# Patient Record
Sex: MALE | Race: WHITE | HISPANIC OR LATINO
[De-identification: names, ages, dates, MRNs, and addresses within clinical notes are randomized per-mention and may not be internally consistent; named-entity substitution may affect disease eponyms.]

---

## 2019-05-30 PROBLEM — Z00.00 ENCOUNTER FOR PREVENTIVE HEALTH EXAMINATION: Status: ACTIVE | Noted: 2019-05-30

## 2019-06-04 ENCOUNTER — APPOINTMENT (OUTPATIENT)
Dept: ORTHOPEDIC SURGERY | Facility: CLINIC | Age: 54
End: 2019-06-04
Payer: COMMERCIAL

## 2019-06-04 VITALS
HEART RATE: 90 BPM | SYSTOLIC BLOOD PRESSURE: 135 MMHG | DIASTOLIC BLOOD PRESSURE: 82 MMHG | BODY MASS INDEX: 34.36 KG/M2 | WEIGHT: 240 LBS | HEIGHT: 70 IN

## 2019-06-04 DIAGNOSIS — Z78.9 OTHER SPECIFIED HEALTH STATUS: ICD-10-CM

## 2019-06-04 DIAGNOSIS — M16.11 UNILATERAL PRIMARY OSTEOARTHRITIS, RIGHT HIP: ICD-10-CM

## 2019-06-04 DIAGNOSIS — Z60.2 PROBLEMS RELATED TO LIVING ALONE: ICD-10-CM

## 2019-06-04 PROCEDURE — 99205 OFFICE O/P NEW HI 60 MIN: CPT

## 2019-06-04 PROCEDURE — 73522 X-RAY EXAM HIPS BI 3-4 VIEWS: CPT

## 2019-06-04 RX ORDER — NAPROXEN SODIUM 220 MG
TABLET ORAL
Refills: 0 | Status: ACTIVE | COMMUNITY

## 2019-06-04 SDOH — SOCIAL STABILITY - SOCIAL INSECURITY: PROBLEMS RELATED TO LIVING ALONE: Z60.2

## 2019-06-04 NOTE — DISCUSSION/SUMMARY
[de-identified] : The patient about a right total hip replacement.  He comes here because he felt he needs it because of his marked pain and limitations.    A  long discussion was had with the patient as what the total joint replacement would entail.  A model was used to demonstrate the operation.  The hospitalization and rehabilitation were discussed.  He has a perioperative antibiotics and DVT prophylaxis were discussed.   The risks, benefits and alternatives to surgical intervention were discussed at length with the patient. Specific risks discussed included: infection, wound breakdown, numbness and damage to nerves, tendon, muscle, arteries or other blood vessels.  The possibility of recurrent pain, no improvement in pain and actual worsening of the pain were also mentioned in conversation with the patient. Medical complications related to the patient's general medical health including deep vein thrombosis, pulmonary embolus, heart attack, stroke, death and other complications from anesthesia were discussed as well. The patient was told that we will take steps to minimize these risks by using sterile technique, antibiotics and DVT prophylaxis when appropriate and following the patient postoperatively in the clinic setting to monitor progress. The benefits of surgery were discussed with the patient including the potential to improve the current clinical condition throughout operative intervention. Alternatives to surgical intervention include continued conservative management which may yield less than optimal results this particular patient. All questions were answered to the satisfaction of the patient.  A dual mobility will be considered in the operating room because of his job which is 1 of an  requires him to get in many different positions doing his work.

## 2019-06-04 NOTE — HISTORY OF PRESENT ILLNESS
[Stable] : stable [9] : a maximum pain level of 9/10 [5] : a current pain level of 5/10 [de-identified] : C/o RT groin pain x 5 yrs, gradually  getting worse with severity over the past 2 yrs.\par Denies injury, or radicular symptoms. \par Left groin is ok. \par He did PT 4 yrs ago.\par Reports RT knee pain occasionally with clicking. \par Aleve helps.\par Difficulty ambulating.\par Reports +limp\par Has xrays.

## 2019-06-04 NOTE — PHYSICAL EXAM
[de-identified] : Constitutional - the patient is of normal build and nutrition.  The patient remains oriented to person, time, and  place.  Mood is normal. Vital signs as recorded are within normal limits.  The patients gait is a significant limp off his right hip. The patient has satisfactory  balance and can to  easily walk on toes and heels.\par \par The patient has no difficulty with respiration. Respiration at rest is a normal rate. The patient is not short of breath and has not become short of breath with short ambulation. There is no audible wheezing. No coughing.\par \par Skin is normal for the patient's age. There are no abnormal masses or lymph nodes which stand out in the lower extremities.\par \par Spine - deep tendon reflexes are symmetric\par \par \par UPPER EXTREMITIES \par \par Shoulders ROM  is symmetric  and the motion is satisfactory.  There is no significant shoulder pain or limitation in motion which would make using a cane or a walker difficult. Shoulder stability and  strength are satisfactory.\par \par Circulation appears satisfactory with pedal pulses present.  There is no major edema in the lower legs. No skin tenderness or increased temperature. No major varicosities.\par \par HIP EXAMINATION the abduction and abduction as well as rotation measurements were taken with the hip in flexion.\par \par Motion\par Flexion right hip 100 degrees left hip 135 degrees, abduction right hip 30 degrees left hip 75 degrees, adduction right hip -20 degrees left hip 30 degrees, external rotation right hip 20 degrees left hip 70 degrees, internal rotation right hip -20 degrees left hip 10 degrees.  He has pain with any motion in his right hip.  He has pain with weightbearing on his right hip.  There is no crepitus in his hip.  He does hold his hip in slight external rotation.  Guards the strength in his right hip.\par \par \par \par KNEE EXAMINATION\par \par Motion\par Right Knee                 *[0-135]\par Left  Knee                  *[0-135]\par The Knees have very good motion. There is good medial lateral and anterior posterior stability. There is no crepitus. There is no effusion. There is good strength across the knees.\par \par Ankle and foot examination\par Of the ankle has normal motion.  There is normal ankle stability.  The patient has no major abnormalities of the foot.\par \par \par \par  [de-identified] : An AP of the pelvis and a lateral of the right joint space maintained but a very early osteophyte is forming of the inferior acetabulum.  His right hip shows severe degeneration bone against bone contact superiorly cystic degeneration of the acetabulum peripheral osteophytes at the head neck junction and an osteophyte off the inferior acetabulum.  Impression severe degenerative osteoarthritis right hip.

## 2019-06-24 ENCOUNTER — OUTPATIENT (OUTPATIENT)
Dept: OUTPATIENT SERVICES | Facility: HOSPITAL | Age: 54
LOS: 1 days | End: 2019-06-24
Payer: COMMERCIAL

## 2019-06-24 VITALS
SYSTOLIC BLOOD PRESSURE: 111 MMHG | WEIGHT: 248.02 LBS | HEART RATE: 78 BPM | OXYGEN SATURATION: 98 % | RESPIRATION RATE: 18 BRPM | DIASTOLIC BLOOD PRESSURE: 78 MMHG | HEIGHT: 70 IN | TEMPERATURE: 98 F

## 2019-06-24 DIAGNOSIS — Z29.9 ENCOUNTER FOR PROPHYLACTIC MEASURES, UNSPECIFIED: ICD-10-CM

## 2019-06-24 DIAGNOSIS — Z98.890 OTHER SPECIFIED POSTPROCEDURAL STATES: Chronic | ICD-10-CM

## 2019-06-24 DIAGNOSIS — M16.11 UNILATERAL PRIMARY OSTEOARTHRITIS, RIGHT HIP: ICD-10-CM

## 2019-06-24 DIAGNOSIS — G47.33 OBSTRUCTIVE SLEEP APNEA (ADULT) (PEDIATRIC): ICD-10-CM

## 2019-06-24 DIAGNOSIS — Z01.818 ENCOUNTER FOR OTHER PREPROCEDURAL EXAMINATION: ICD-10-CM

## 2019-06-24 DIAGNOSIS — Z90.49 ACQUIRED ABSENCE OF OTHER SPECIFIED PARTS OF DIGESTIVE TRACT: Chronic | ICD-10-CM

## 2019-06-24 LAB
ANION GAP SERPL CALC-SCNC: 12 MMOL/L — SIGNIFICANT CHANGE UP (ref 5–17)
BLD GP AB SCN SERPL QL: NEGATIVE — SIGNIFICANT CHANGE UP
BUN SERPL-MCNC: 21 MG/DL — SIGNIFICANT CHANGE UP (ref 7–23)
CALCIUM SERPL-MCNC: 9.8 MG/DL — SIGNIFICANT CHANGE UP (ref 8.4–10.5)
CHLORIDE SERPL-SCNC: 99 MMOL/L — SIGNIFICANT CHANGE UP (ref 96–108)
CO2 SERPL-SCNC: 26 MMOL/L — SIGNIFICANT CHANGE UP (ref 22–31)
CREAT SERPL-MCNC: 0.73 MG/DL — SIGNIFICANT CHANGE UP (ref 0.5–1.3)
GLUCOSE SERPL-MCNC: 88 MG/DL — SIGNIFICANT CHANGE UP (ref 70–99)
HBA1C BLD-MCNC: 6.2 % — HIGH (ref 4–5.6)
HCT VFR BLD CALC: 47.6 % — HIGH (ref 34.5–45)
HGB BLD-MCNC: 15.5 G/DL — SIGNIFICANT CHANGE UP (ref 11.5–15.5)
MCHC RBC-ENTMCNC: 29.4 PG — SIGNIFICANT CHANGE UP (ref 27–34)
MCHC RBC-ENTMCNC: 32.6 GM/DL — SIGNIFICANT CHANGE UP (ref 32–36)
MCV RBC AUTO: 90.3 FL — SIGNIFICANT CHANGE UP (ref 80–100)
MRSA PCR RESULT.: SIGNIFICANT CHANGE UP
PLATELET # BLD AUTO: 277 K/UL — SIGNIFICANT CHANGE UP (ref 150–400)
POTASSIUM SERPL-MCNC: 4.3 MMOL/L — SIGNIFICANT CHANGE UP (ref 3.5–5.3)
POTASSIUM SERPL-SCNC: 4.3 MMOL/L — SIGNIFICANT CHANGE UP (ref 3.5–5.3)
RBC # BLD: 5.27 M/UL — HIGH (ref 3.8–5.2)
RBC # FLD: 12.4 % — SIGNIFICANT CHANGE UP (ref 10.3–14.5)
RH IG SCN BLD-IMP: POSITIVE — SIGNIFICANT CHANGE UP
S AUREUS DNA NOSE QL NAA+PROBE: SIGNIFICANT CHANGE UP
SODIUM SERPL-SCNC: 137 MMOL/L — SIGNIFICANT CHANGE UP (ref 135–145)
WBC # BLD: 8.37 K/UL — SIGNIFICANT CHANGE UP (ref 3.8–10.5)
WBC # FLD AUTO: 8.37 K/UL — SIGNIFICANT CHANGE UP (ref 3.8–10.5)

## 2019-06-24 PROCEDURE — 87641 MR-STAPH DNA AMP PROBE: CPT

## 2019-06-24 PROCEDURE — 86850 RBC ANTIBODY SCREEN: CPT

## 2019-06-24 PROCEDURE — 80048 BASIC METABOLIC PNL TOTAL CA: CPT

## 2019-06-24 PROCEDURE — 85027 COMPLETE CBC AUTOMATED: CPT

## 2019-06-24 PROCEDURE — 87640 STAPH A DNA AMP PROBE: CPT

## 2019-06-24 PROCEDURE — 86901 BLOOD TYPING SEROLOGIC RH(D): CPT

## 2019-06-24 PROCEDURE — 83036 HEMOGLOBIN GLYCOSYLATED A1C: CPT

## 2019-06-24 PROCEDURE — 86900 BLOOD TYPING SEROLOGIC ABO: CPT

## 2019-06-24 PROCEDURE — G0463: CPT

## 2019-06-24 RX ORDER — CHLORHEXIDINE GLUCONATE 213 G/1000ML
1 SOLUTION TOPICAL DAILY
Refills: 0 | Status: DISCONTINUED | OUTPATIENT
Start: 2019-07-08 | End: 2019-07-09

## 2019-06-24 RX ORDER — SODIUM CHLORIDE 9 MG/ML
3 INJECTION INTRAMUSCULAR; INTRAVENOUS; SUBCUTANEOUS EVERY 8 HOURS
Refills: 0 | Status: DISCONTINUED | OUTPATIENT
Start: 2019-07-08 | End: 2019-07-09

## 2019-06-24 RX ORDER — LIDOCAINE HCL 20 MG/ML
0.2 VIAL (ML) INJECTION ONCE
Refills: 0 | Status: DISCONTINUED | OUTPATIENT
Start: 2019-07-08 | End: 2019-07-09

## 2019-06-24 RX ORDER — CEFAZOLIN SODIUM 1 G
2000 VIAL (EA) INJECTION ONCE
Refills: 0 | Status: COMPLETED | OUTPATIENT
Start: 2019-07-08 | End: 2019-07-08

## 2019-06-24 NOTE — H&P PST ADULT - NSICDXPASTSURGICALHX_GEN_ALL_CORE_FT
PAST SURGICAL HISTORY:  History of colonoscopy 2018    History of laparoscopic cholecystectomy 10 years ago

## 2019-06-24 NOTE — H&P PST ADULT - ASSESSMENT
CAPRINI SCORE [CLOT updated 18]    AGE RELATED RISK FACTORS                                                       MOBILITY RELATED FACTORS  [x ] Age 41-60 years                                            (1 Point)                    [ ] Bed rest                                                        (1 Point)  [ ] Age: 61-74 years                                           (2 Points)                  [ ] Plaster cast                                                   (2 Points)  [ ] Age= 75 years                                              (3 Points)                    [ ] Bed bound for more than 72 hours                 (2 Points)    DISEASE RELATED RISK FACTORS                                               GENDER SPECIFIC FACTORS  [ ] Edema in the lower extremities                       (1 Point)              [ ] Pregnancy                                                     (1 Point)  [ ] Varicose veins                                               (1 Point)                     [ ] Post-partum < 6 weeks                                   (1 Point)             [x ] BMI > 25 Kg/m2                                            (1 Point)                     [ ] Hormonal therapy  or oral contraception          (1 Point)                 [ ] Sepsis (in the previous month)                        (1 Point)               [ ] History of pregnancy complications                 (1 point)  [ ] Pneumonia or serious lung disease                                               [ ] Unexplained or recurrent                     (1 Point)           (in the previous month)                               (1 Point)  [ ] Abnormal pulmonary function test                     (1 Point)                 SURGERY RELATED RISK FACTORS  [ ] Acute myocardial infarction                              (1 Point)               [ ]  Section                                             (1 Point)  [ ] Congestive heart failure (in the previous month)  (1 Point)      [ ] Minor surgery                                                  (1 Point)   [ ] Inflammatory bowel disease                             (1 Point)               [ ] Arthroscopic surgery                                        (2 Points)  [ ] Central venous access                                      (2 Points)                [ ] General surgery lasting more than 45 minutes (2 points)  [ ] Present or previous malignancy                     (2 Points)                x[ ] Elective arthroplasty                                         (5 points)    [ ] Stroke (in the previous month)                          (5 Points)                                                                                                                                                           HEMATOLOGY RELATED FACTORS                                                 TRAUMA RELATED RISK FACTORS  [ ] Prior episodes of VTE                                     (3 Points)                [ ] Fracture of the hip, pelvis, or leg                       (5 Points)  [ ] Positive family history for VTE                         (3 Points)             [ ] Acute spinal cord injury (in the previous month)  (5 Points)  [ ] Prothrombin 59201 A                                     (3 Points)               [ ] Paralysis  (less than 1 month)                             (5 Points)  [ ] Factor V Leiden                                             (3 Points)                  [ ] Multiple Trauma within 1 month                        (5 Points)  [ ] Lupus anticoagulants                                     (3 Points)                                                           [ ] Anticardiolipin antibodies                               (3 Points)                                                       [ ] High homocysteine in the blood                      (3 Points)                                             [ ] Other congenital or acquired thrombophilia      (3 Points)                                                [ ] Heparin induced thrombocytopenia                  (3 Points)                                     Total Score [    7      ]

## 2019-06-24 NOTE — H&P PST ADULT - NSICDXPASTMEDICALHX_GEN_ALL_CORE_FT
PAST MEDICAL HISTORY:  Obesity BMI- 35    YAAKOV (obstructive sleep apnea) Criteria    Unilateral primary osteoarthritis, right hip

## 2019-06-24 NOTE — H&P PST ADULT - NSICDXPROBLEM_GEN_ALL_CORE_FT
PROBLEM DIAGNOSES  Problem: Unilateral primary osteoarthritis, right hip  Assessment and Plan: Right Total Hip Replacement on 7/8/19  Pre- Op Instructions discussed   labs sent  pre- emptive ordered     Problem: Prophylactic measure  Assessment and Plan: The Caprini score indicates that this patient is at high risk for a VTE event (score 6 or greater). Surgical patients in this group will benefit from both pharmacologic prophylaxis and intermittent compression devices.  The surgical team will determine the balance between VTE risk and bleeding risk, and other clinical considerations      Problem: YAAKOV (obstructive sleep apnea)  Assessment and Plan: OR Booking Notified   YAAKOV Precautions

## 2019-06-24 NOTE — H&P PST ADULT - HISTORY OF PRESENT ILLNESS
53 y/o male with c/o  RT groin pain x 5 yrs, gradually getting worse with severity over the past 2 yrs, no improvement with conservative management followed by ortho . Pt Denies injury, or radicular symptoms. Presents to PST for scheduled Right Total Hip Replacement on 7/8/19 .

## 2019-06-24 NOTE — H&P PST ADULT - NSANTHOSAYNRD_GEN_A_CORE
No. YAAKOV screening performed.  STOP BANG Legend: 0-2 = LOW Risk; 3-4 = INTERMEDIATE Risk; 5-8 = HIGH Risk No. YAAKOV screening performed.  STOP BANG Legend: 0-2 = LOW Risk; 3-4 = INTERMEDIATE Risk; 5-8 = HIGH Risk/criteria

## 2019-07-07 ENCOUNTER — TRANSCRIPTION ENCOUNTER (OUTPATIENT)
Age: 54
End: 2019-07-07

## 2019-07-08 ENCOUNTER — APPOINTMENT (OUTPATIENT)
Dept: ORTHOPEDIC SURGERY | Facility: HOSPITAL | Age: 54
End: 2019-07-08

## 2019-07-08 ENCOUNTER — INPATIENT (INPATIENT)
Facility: HOSPITAL | Age: 54
LOS: 0 days | Discharge: HOME | DRG: 470 | End: 2019-07-09
Attending: ORTHOPAEDIC SURGERY | Admitting: ORTHOPAEDIC SURGERY
Payer: COMMERCIAL

## 2019-07-08 ENCOUNTER — RESULT REVIEW (OUTPATIENT)
Age: 54
End: 2019-07-08

## 2019-07-08 VITALS
HEIGHT: 70 IN | OXYGEN SATURATION: 98 % | SYSTOLIC BLOOD PRESSURE: 123 MMHG | TEMPERATURE: 98 F | DIASTOLIC BLOOD PRESSURE: 83 MMHG | HEART RATE: 82 BPM | WEIGHT: 248.02 LBS | RESPIRATION RATE: 18 BRPM

## 2019-07-08 DIAGNOSIS — M16.11 UNILATERAL PRIMARY OSTEOARTHRITIS, RIGHT HIP: ICD-10-CM

## 2019-07-08 DIAGNOSIS — Z90.49 ACQUIRED ABSENCE OF OTHER SPECIFIED PARTS OF DIGESTIVE TRACT: Chronic | ICD-10-CM

## 2019-07-08 DIAGNOSIS — Z98.890 OTHER SPECIFIED POSTPROCEDURAL STATES: Chronic | ICD-10-CM

## 2019-07-08 LAB
ANION GAP SERPL CALC-SCNC: 12 MMOL/L — SIGNIFICANT CHANGE UP (ref 5–17)
BUN SERPL-MCNC: 18 MG/DL — SIGNIFICANT CHANGE UP (ref 7–23)
CALCIUM SERPL-MCNC: 8.9 MG/DL — SIGNIFICANT CHANGE UP (ref 8.4–10.5)
CHLORIDE SERPL-SCNC: 101 MMOL/L — SIGNIFICANT CHANGE UP (ref 96–108)
CO2 SERPL-SCNC: 27 MMOL/L — SIGNIFICANT CHANGE UP (ref 22–31)
CREAT SERPL-MCNC: 0.88 MG/DL — SIGNIFICANT CHANGE UP (ref 0.5–1.3)
GLUCOSE BLDC GLUCOMTR-MCNC: 138 MG/DL — HIGH (ref 70–99)
GLUCOSE SERPL-MCNC: 143 MG/DL — HIGH (ref 70–99)
HCT VFR BLD CALC: 43.1 % — SIGNIFICANT CHANGE UP (ref 39–50)
HGB BLD-MCNC: 15.2 G/DL — SIGNIFICANT CHANGE UP (ref 13–17)
MCHC RBC-ENTMCNC: 31.5 PG — SIGNIFICANT CHANGE UP (ref 27–34)
MCHC RBC-ENTMCNC: 35.2 GM/DL — SIGNIFICANT CHANGE UP (ref 32–36)
MCV RBC AUTO: 89.7 FL — SIGNIFICANT CHANGE UP (ref 80–100)
PLATELET # BLD AUTO: 261 K/UL — SIGNIFICANT CHANGE UP (ref 150–400)
POTASSIUM SERPL-MCNC: 4.6 MMOL/L — SIGNIFICANT CHANGE UP (ref 3.5–5.3)
POTASSIUM SERPL-SCNC: 4.6 MMOL/L — SIGNIFICANT CHANGE UP (ref 3.5–5.3)
RBC # BLD: 4.81 M/UL — SIGNIFICANT CHANGE UP (ref 4.2–5.8)
RBC # FLD: 11.5 % — SIGNIFICANT CHANGE UP (ref 10.3–14.5)
RH IG SCN BLD-IMP: POSITIVE — SIGNIFICANT CHANGE UP
SODIUM SERPL-SCNC: 140 MMOL/L — SIGNIFICANT CHANGE UP (ref 135–145)
WBC # BLD: 14.3 K/UL — HIGH (ref 3.8–10.5)
WBC # FLD AUTO: 14.3 K/UL — HIGH (ref 3.8–10.5)

## 2019-07-08 PROCEDURE — 72170 X-RAY EXAM OF PELVIS: CPT | Mod: 26

## 2019-07-08 PROCEDURE — 88311 DECALCIFY TISSUE: CPT | Mod: 26

## 2019-07-08 PROCEDURE — 27130 TOTAL HIP ARTHROPLASTY: CPT | Mod: RT

## 2019-07-08 PROCEDURE — 88305 TISSUE EXAM BY PATHOLOGIST: CPT | Mod: 26

## 2019-07-08 PROCEDURE — 73501 X-RAY EXAM HIP UNI 1 VIEW: CPT | Mod: 26,RT

## 2019-07-08 RX ORDER — CEFAZOLIN SODIUM 1 G
2000 VIAL (EA) INJECTION EVERY 8 HOURS
Refills: 0 | Status: COMPLETED | OUTPATIENT
Start: 2019-07-08 | End: 2019-07-09

## 2019-07-08 RX ORDER — OXYCODONE HYDROCHLORIDE 5 MG/1
5 TABLET ORAL EVERY 4 HOURS
Refills: 0 | Status: DISCONTINUED | OUTPATIENT
Start: 2019-07-08 | End: 2019-07-09

## 2019-07-08 RX ORDER — ACETAMINOPHEN 500 MG
975 TABLET ORAL ONCE
Refills: 0 | Status: DISCONTINUED | OUTPATIENT
Start: 2019-07-08 | End: 2019-07-08

## 2019-07-08 RX ORDER — PANTOPRAZOLE SODIUM 20 MG/1
40 TABLET, DELAYED RELEASE ORAL ONCE
Refills: 0 | Status: COMPLETED | OUTPATIENT
Start: 2019-07-08 | End: 2019-07-08

## 2019-07-08 RX ORDER — DOCUSATE SODIUM 100 MG
100 CAPSULE ORAL THREE TIMES A DAY
Refills: 0 | Status: DISCONTINUED | OUTPATIENT
Start: 2019-07-08 | End: 2019-07-09

## 2019-07-08 RX ORDER — KETOROLAC TROMETHAMINE 30 MG/ML
15 SYRINGE (ML) INJECTION EVERY 8 HOURS
Refills: 0 | Status: DISCONTINUED | OUTPATIENT
Start: 2019-07-08 | End: 2019-07-09

## 2019-07-08 RX ORDER — SENNA PLUS 8.6 MG/1
2 TABLET ORAL AT BEDTIME
Refills: 0 | Status: DISCONTINUED | OUTPATIENT
Start: 2019-07-08 | End: 2019-07-09

## 2019-07-08 RX ORDER — SODIUM CHLORIDE 9 MG/ML
1000 INJECTION, SOLUTION INTRAVENOUS
Refills: 0 | Status: DISCONTINUED | OUTPATIENT
Start: 2019-07-08 | End: 2019-07-09

## 2019-07-08 RX ORDER — ONDANSETRON 8 MG/1
4 TABLET, FILM COATED ORAL EVERY 6 HOURS
Refills: 0 | Status: DISCONTINUED | OUTPATIENT
Start: 2019-07-08 | End: 2019-07-09

## 2019-07-08 RX ORDER — PANTOPRAZOLE SODIUM 20 MG/1
40 TABLET, DELAYED RELEASE ORAL
Refills: 0 | Status: DISCONTINUED | OUTPATIENT
Start: 2019-07-08 | End: 2019-07-09

## 2019-07-08 RX ORDER — ACETAMINOPHEN 500 MG
975 TABLET ORAL EVERY 6 HOURS
Refills: 0 | Status: DISCONTINUED | OUTPATIENT
Start: 2019-07-08 | End: 2019-07-09

## 2019-07-08 RX ORDER — TRAMADOL HYDROCHLORIDE 50 MG/1
50 TABLET ORAL EVERY 8 HOURS
Refills: 0 | Status: DISCONTINUED | OUTPATIENT
Start: 2019-07-08 | End: 2019-07-09

## 2019-07-08 RX ORDER — ONDANSETRON 8 MG/1
4 TABLET, FILM COATED ORAL ONCE
Refills: 0 | Status: DISCONTINUED | OUTPATIENT
Start: 2019-07-08 | End: 2019-07-08

## 2019-07-08 RX ORDER — GABAPENTIN 400 MG/1
300 CAPSULE ORAL ONCE
Refills: 0 | Status: COMPLETED | OUTPATIENT
Start: 2019-07-08 | End: 2019-07-08

## 2019-07-08 RX ORDER — SODIUM CHLORIDE 9 MG/ML
500 INJECTION, SOLUTION INTRAVENOUS ONCE
Refills: 0 | Status: COMPLETED | OUTPATIENT
Start: 2019-07-08 | End: 2019-07-08

## 2019-07-08 RX ORDER — ASPIRIN/CALCIUM CARB/MAGNESIUM 324 MG
325 TABLET ORAL
Refills: 0 | Status: DISCONTINUED | OUTPATIENT
Start: 2019-07-08 | End: 2019-07-09

## 2019-07-08 RX ORDER — POLYETHYLENE GLYCOL 3350 17 G/17G
17 POWDER, FOR SOLUTION ORAL DAILY
Refills: 0 | Status: DISCONTINUED | OUTPATIENT
Start: 2019-07-08 | End: 2019-07-09

## 2019-07-08 RX ORDER — SODIUM CHLORIDE 9 MG/ML
500 INJECTION, SOLUTION INTRAVENOUS ONCE
Refills: 0 | Status: COMPLETED | OUTPATIENT
Start: 2019-07-09 | End: 2019-07-09

## 2019-07-08 RX ORDER — DEXAMETHASONE 0.5 MG/5ML
8 ELIXIR ORAL ONCE
Refills: 0 | Status: COMPLETED | OUTPATIENT
Start: 2019-07-09 | End: 2019-07-09

## 2019-07-08 RX ORDER — CELECOXIB 200 MG/1
200 CAPSULE ORAL
Refills: 0 | Status: DISCONTINUED | OUTPATIENT
Start: 2019-07-08 | End: 2019-07-09

## 2019-07-08 RX ORDER — OXYCODONE HYDROCHLORIDE 5 MG/1
10 TABLET ORAL EVERY 4 HOURS
Refills: 0 | Status: DISCONTINUED | OUTPATIENT
Start: 2019-07-08 | End: 2019-07-09

## 2019-07-08 RX ORDER — TRAMADOL HYDROCHLORIDE 50 MG/1
50 TABLET ORAL ONCE
Refills: 0 | Status: DISCONTINUED | OUTPATIENT
Start: 2019-07-08 | End: 2019-07-08

## 2019-07-08 RX ORDER — MAGNESIUM HYDROXIDE 400 MG/1
30 TABLET, CHEWABLE ORAL DAILY
Refills: 0 | Status: DISCONTINUED | OUTPATIENT
Start: 2019-07-08 | End: 2019-07-09

## 2019-07-08 RX ORDER — HYDROMORPHONE HYDROCHLORIDE 2 MG/ML
0.5 INJECTION INTRAMUSCULAR; INTRAVENOUS; SUBCUTANEOUS
Refills: 0 | Status: DISCONTINUED | OUTPATIENT
Start: 2019-07-08 | End: 2019-07-08

## 2019-07-08 RX ADMIN — SODIUM CHLORIDE 3 MILLILITER(S): 9 INJECTION INTRAMUSCULAR; INTRAVENOUS; SUBCUTANEOUS at 14:09

## 2019-07-08 RX ADMIN — Medication 100 MILLIGRAM(S): at 19:53

## 2019-07-08 RX ADMIN — Medication 15 MILLIGRAM(S): at 22:47

## 2019-07-08 RX ADMIN — Medication 325 MILLIGRAM(S): at 18:14

## 2019-07-08 RX ADMIN — CHLORHEXIDINE GLUCONATE 1 APPLICATION(S): 213 SOLUTION TOPICAL at 05:57

## 2019-07-08 RX ADMIN — TRAMADOL HYDROCHLORIDE 50 MILLIGRAM(S): 50 TABLET ORAL at 22:17

## 2019-07-08 RX ADMIN — TRAMADOL HYDROCHLORIDE 50 MILLIGRAM(S): 50 TABLET ORAL at 22:47

## 2019-07-08 RX ADMIN — SODIUM CHLORIDE 1000 MILLILITER(S): 9 INJECTION, SOLUTION INTRAVENOUS at 10:30

## 2019-07-08 RX ADMIN — PANTOPRAZOLE SODIUM 40 MILLIGRAM(S): 20 TABLET, DELAYED RELEASE ORAL at 05:57

## 2019-07-08 RX ADMIN — Medication 15 MILLIGRAM(S): at 22:17

## 2019-07-08 RX ADMIN — Medication 15 MILLIGRAM(S): at 14:10

## 2019-07-08 RX ADMIN — Medication 975 MILLIGRAM(S): at 18:43

## 2019-07-08 RX ADMIN — SODIUM CHLORIDE 3 MILLILITER(S): 9 INJECTION INTRAMUSCULAR; INTRAVENOUS; SUBCUTANEOUS at 05:57

## 2019-07-08 RX ADMIN — Medication 100 MILLIGRAM(S): at 22:17

## 2019-07-08 RX ADMIN — Medication 975 MILLIGRAM(S): at 18:13

## 2019-07-08 RX ADMIN — Medication 100 MILLIGRAM(S): at 18:14

## 2019-07-08 RX ADMIN — SODIUM CHLORIDE 3 MILLILITER(S): 9 INJECTION INTRAMUSCULAR; INTRAVENOUS; SUBCUTANEOUS at 22:21

## 2019-07-08 RX ADMIN — GABAPENTIN 300 MILLIGRAM(S): 400 CAPSULE ORAL at 05:56

## 2019-07-08 RX ADMIN — SODIUM CHLORIDE 1000 MILLILITER(S): 9 INJECTION, SOLUTION INTRAVENOUS at 15:35

## 2019-07-08 RX ADMIN — TRAMADOL HYDROCHLORIDE 50 MILLIGRAM(S): 50 TABLET ORAL at 06:57

## 2019-07-08 NOTE — PHYSICAL THERAPY INITIAL EVALUATION ADULT - GENERAL OBSERVATIONS, REHAB EVAL
RACHNA Qureshi called PT department 2/2 pt having to urinate. Pt amadou initial eval fair. Pt rec'd in bed, peripheral IV lock, hip pillow, fiance at bedside, on room air, NAD. Pt agreed to session. Pt moving extremities freely. PT reviewed hip precautions.

## 2019-07-08 NOTE — PHYSICAL THERAPY INITIAL EVALUATION ADULT - ADDITIONAL COMMENTS
Pt states he lives alone in a private home with 6 steps to enter (+handrail), and a flight of steps to bedroom. Pt states prior to admission being independent with all functional mobility and ADLs. Pt states he was working as an  prior to admission.

## 2019-07-08 NOTE — PRE-ANESTHESIA EVALUATION ADULT - NSANTHOSAYNRD_GEN_A_CORE
criteria/No. YAAKOV screening performed.  STOP BANG Legend: 0-2 = LOW Risk; 3-4 = INTERMEDIATE Risk; 5-8 = HIGH Risk

## 2019-07-08 NOTE — PHYSICAL THERAPY INITIAL EVALUATION ADULT - PLANNED THERAPY INTERVENTIONS, PT EVAL
GOAL: Pt will negotiate 9 steps with unilateral handrail in a step-to pattern independently within 2 weeks/gait training/balance training/bed mobility training/strengthening/transfer training

## 2019-07-08 NOTE — CHART NOTE - NSCHARTNOTEFT_GEN_A_CORE
Seen in PACU trying to void  No Chest Pain, SOB, N/V.    T(C): 36.4 (07-08-19 @ 10:00), Max: 36.6 (07-08-19 @ 05:25)  HR: 93 (07-08-19 @ 12:00) (82 - 93)  BP: 120/79 (07-08-19 @ 12:00) (120/79 - 140/75)  RR: 16 (07-08-19 @ 12:00) (12 - 18)  SpO2: 100% (07-08-19 @ 12:00) (88% - 100%)  Wt(kg): --    Exam:  Alert and [place], No Acute Distress  Card: +S1/S2, RRR  Pulm: CTAB  Abdomen soft / benign  [place]  [n ]   EXT   RLE       Aquacel dressing C/D [ x]        Calves soft       (+) DF / PF (strong)  PT;  EHL/ FHL  5/5        No Sensory Deficits noted        2+ pulses  cap refill: brisk 2-3 secs    Xray:---- prosthesis in good alignment                          15.2   14.3<H> )-----------( 261      ( 08 Jul 2019 11:12 )             43.1      07-08    140  |  101  |  18  ----------------------------<  143<H>  4.6   |  27  |  0.88      A/P: S/p R Total hip arthroplasty      -PT/OT-WBAT-posterior precautions  -Chk AM Labs: cbc sma-7  -DVT PPx: Ecotrin 325mg BID  -Pain Control PO/IV Pain Rx  -Continue Current Tx  -Dispo planning: anticipate home      ***See Above  Naren GRECO  Orthopedics  B: [phone]  S: [phone]

## 2019-07-09 ENCOUNTER — TRANSCRIPTION ENCOUNTER (OUTPATIENT)
Age: 54
End: 2019-07-09

## 2019-07-09 VITALS
DIASTOLIC BLOOD PRESSURE: 77 MMHG | RESPIRATION RATE: 18 BRPM | OXYGEN SATURATION: 98 % | TEMPERATURE: 98 F | HEART RATE: 78 BPM | SYSTOLIC BLOOD PRESSURE: 120 MMHG

## 2019-07-09 DIAGNOSIS — M16.11 UNILATERAL PRIMARY OSTEOARTHRITIS, RIGHT HIP: ICD-10-CM

## 2019-07-09 LAB
ANION GAP SERPL CALC-SCNC: 9 MMOL/L — SIGNIFICANT CHANGE UP (ref 5–17)
BUN SERPL-MCNC: 18 MG/DL — SIGNIFICANT CHANGE UP (ref 7–23)
CALCIUM SERPL-MCNC: 8.8 MG/DL — SIGNIFICANT CHANGE UP (ref 8.4–10.5)
CHLORIDE SERPL-SCNC: 101 MMOL/L — SIGNIFICANT CHANGE UP (ref 96–108)
CO2 SERPL-SCNC: 27 MMOL/L — SIGNIFICANT CHANGE UP (ref 22–31)
CREAT SERPL-MCNC: 0.81 MG/DL — SIGNIFICANT CHANGE UP (ref 0.5–1.3)
GLUCOSE SERPL-MCNC: 126 MG/DL — HIGH (ref 70–99)
HCT VFR BLD CALC: 41 % — SIGNIFICANT CHANGE UP (ref 39–50)
HGB BLD-MCNC: 13.4 G/DL — SIGNIFICANT CHANGE UP (ref 13–17)
MCHC RBC-ENTMCNC: 29.6 PG — SIGNIFICANT CHANGE UP (ref 27–34)
MCHC RBC-ENTMCNC: 32.7 GM/DL — SIGNIFICANT CHANGE UP (ref 32–36)
MCV RBC AUTO: 90.5 FL — SIGNIFICANT CHANGE UP (ref 80–100)
PLATELET # BLD AUTO: 276 K/UL — SIGNIFICANT CHANGE UP (ref 150–400)
POTASSIUM SERPL-MCNC: 4.1 MMOL/L — SIGNIFICANT CHANGE UP (ref 3.5–5.3)
POTASSIUM SERPL-SCNC: 4.1 MMOL/L — SIGNIFICANT CHANGE UP (ref 3.5–5.3)
RBC # BLD: 4.53 M/UL — SIGNIFICANT CHANGE UP (ref 4.2–5.8)
RBC # FLD: 12.3 % — SIGNIFICANT CHANGE UP (ref 10.3–14.5)
SODIUM SERPL-SCNC: 137 MMOL/L — SIGNIFICANT CHANGE UP (ref 135–145)
WBC # BLD: 12.09 K/UL — HIGH (ref 3.8–10.5)
WBC # FLD AUTO: 12.09 K/UL — HIGH (ref 3.8–10.5)

## 2019-07-09 PROCEDURE — 80048 BASIC METABOLIC PNL TOTAL CA: CPT

## 2019-07-09 PROCEDURE — 97161 PT EVAL LOW COMPLEX 20 MIN: CPT

## 2019-07-09 PROCEDURE — C1776: CPT

## 2019-07-09 PROCEDURE — 72170 X-RAY EXAM OF PELVIS: CPT

## 2019-07-09 PROCEDURE — 86901 BLOOD TYPING SEROLOGIC RH(D): CPT

## 2019-07-09 PROCEDURE — 73501 X-RAY EXAM HIP UNI 1 VIEW: CPT

## 2019-07-09 PROCEDURE — 82962 GLUCOSE BLOOD TEST: CPT

## 2019-07-09 PROCEDURE — 86900 BLOOD TYPING SEROLOGIC ABO: CPT

## 2019-07-09 PROCEDURE — 88311 DECALCIFY TISSUE: CPT

## 2019-07-09 PROCEDURE — 88305 TISSUE EXAM BY PATHOLOGIST: CPT

## 2019-07-09 PROCEDURE — 97530 THERAPEUTIC ACTIVITIES: CPT

## 2019-07-09 PROCEDURE — 97116 GAIT TRAINING THERAPY: CPT

## 2019-07-09 PROCEDURE — 85027 COMPLETE CBC AUTOMATED: CPT

## 2019-07-09 PROCEDURE — 97165 OT EVAL LOW COMPLEX 30 MIN: CPT

## 2019-07-09 RX ORDER — POLYETHYLENE GLYCOL 3350 17 G/17G
17 POWDER, FOR SOLUTION ORAL
Qty: 0 | Refills: 0 | DISCHARGE
Start: 2019-07-09

## 2019-07-09 RX ORDER — SENNA PLUS 8.6 MG/1
2 TABLET ORAL
Qty: 0 | Refills: 0 | DISCHARGE
Start: 2019-07-09

## 2019-07-09 RX ORDER — ACETAMINOPHEN 500 MG
3 TABLET ORAL
Qty: 0 | Refills: 0 | DISCHARGE
Start: 2019-07-09

## 2019-07-09 RX ORDER — OXYCODONE HYDROCHLORIDE 5 MG/1
1 TABLET ORAL
Qty: 42 | Refills: 0
Start: 2019-07-09 | End: 2019-07-15

## 2019-07-09 RX ORDER — PANTOPRAZOLE SODIUM 20 MG/1
1 TABLET, DELAYED RELEASE ORAL
Qty: 30 | Refills: 0
Start: 2019-07-09 | End: 2019-08-07

## 2019-07-09 RX ORDER — ASPIRIN/CALCIUM CARB/MAGNESIUM 324 MG
1 TABLET ORAL
Qty: 80 | Refills: 0
Start: 2019-07-09 | End: 2019-08-17

## 2019-07-09 RX ORDER — TRAMADOL HYDROCHLORIDE 50 MG/1
1 TABLET ORAL
Qty: 28 | Refills: 0
Start: 2019-07-09 | End: 2019-07-15

## 2019-07-09 RX ADMIN — Medication 325 MILLIGRAM(S): at 06:24

## 2019-07-09 RX ADMIN — Medication 101.6 MILLIGRAM(S): at 06:31

## 2019-07-09 RX ADMIN — CELECOXIB 200 MILLIGRAM(S): 200 CAPSULE ORAL at 06:30

## 2019-07-09 RX ADMIN — SODIUM CHLORIDE 1000 MILLILITER(S): 9 INJECTION, SOLUTION INTRAVENOUS at 06:31

## 2019-07-09 RX ADMIN — CELECOXIB 200 MILLIGRAM(S): 200 CAPSULE ORAL at 07:00

## 2019-07-09 RX ADMIN — PANTOPRAZOLE SODIUM 40 MILLIGRAM(S): 20 TABLET, DELAYED RELEASE ORAL at 06:24

## 2019-07-09 RX ADMIN — Medication 975 MILLIGRAM(S): at 06:24

## 2019-07-09 RX ADMIN — Medication 100 MILLIGRAM(S): at 03:56

## 2019-07-09 RX ADMIN — Medication 975 MILLIGRAM(S): at 13:00

## 2019-07-09 RX ADMIN — SODIUM CHLORIDE 3 MILLILITER(S): 9 INJECTION INTRAMUSCULAR; INTRAVENOUS; SUBCUTANEOUS at 06:34

## 2019-07-09 RX ADMIN — Medication 975 MILLIGRAM(S): at 12:30

## 2019-07-09 RX ADMIN — Medication 975 MILLIGRAM(S): at 07:00

## 2019-07-09 NOTE — CONSULT NOTE ADULT - SUBJECTIVE AND OBJECTIVE BOX
CHIEF COMPLAINT:Patient is a 54y old  Male who presents with a chief complaint of right thr (09 Jul 2019 06:34)      HISTORY OF PRESENT ILLNESS:      This is a pleasant 54 male with history as below, known to me from office   is now s/p right THR   denies any chest pain, sob, palpitation, dizziness or syncope.     PAST MEDICAL & SURGICAL HISTORY:  YAAKOV (obstructive sleep apnea): Criteria  Unilateral primary osteoarthritis, right hip  Obesity: BMI- 35  History of colonoscopy: 2018  History of laparoscopic cholecystectomy: 10 years ago          MEDICATIONS:  aspirin enteric coated 325 milliGRAM(s) Oral two times a day  acetaminophen   Tablet .. 975 milliGRAM(s) Oral every 6 hours  celecoxib 200 milliGRAM(s) Oral two times a day  ketorolac   Injectable 15 milliGRAM(s) IV Push every 8 hours  ondansetron Injectable 4 milliGRAM(s) IV Push every 6 hours PRN  oxyCODONE    IR 5 milliGRAM(s) Oral every 4 hours PRN  oxyCODONE    IR 10 milliGRAM(s) Oral every 4 hours PRN  traMADol 50 milliGRAM(s) Oral every 8 hours PRN    aluminum hydroxide/magnesium hydroxide/simethicone Suspension 30 milliLiter(s) Oral four times a day PRN  docusate sodium 100 milliGRAM(s) Oral three times a day  magnesium hydroxide Suspension 30 milliLiter(s) Oral daily PRN  pantoprazole    Tablet 40 milliGRAM(s) Oral before breakfast  polyethylene glycol 3350 17 Gram(s) Oral daily  senna 2 Tablet(s) Oral at bedtime PRN      chlorhexidine 2% Cloths 1 Application(s) Topical daily  lactated ringers. 1000 milliLiter(s) IV Continuous <Continuous>  sodium chloride 0.9% lock flush 3 milliLiter(s) IV Push every 8 hours      FAMILY HISTORY:      Non-contributory    SOCIAL HISTORY:    No tobacco, drugs or etoh    Allergies    No Known Allergies    Intolerances    	    REVIEW OF SYSTEMS:  as above  The rest of the 14 points ROS reviewed and except above they are unremarkable.        PHYSICAL EXAM:  T(C): 36.7 (07-09-19 @ 04:43), Max: 37.2 (07-08-19 @ 15:05)  HR: 79 (07-09-19 @ 04:43) (79 - 103)  BP: 124/75 (07-09-19 @ 04:43) (107/70 - 140/75)  RR: 18 (07-09-19 @ 04:43) (12 - 18)  SpO2: 99% (07-09-19 @ 04:43) (88% - 100%)  Wt(kg): --  I&O's Summary    08 Jul 2019 07:01  -  09 Jul 2019 07:00  --------------------------------------------------------  IN: 1100 mL / OUT: 1275 mL / NET: -175 mL        Appearance: Normal	  HEENT:   no gross abnormality   Cardiovascular: Normal S1 S2,    Murmur:   Neck: JVP normal  Respiratory: Lungs clear   Gastrointestinal:  Soft, Non-tender  Skin: normal   Neuro: No gross deficits.   Psychiatry:  Mood & affect flat  Ext: No edema    LABS/DATA:    TELEMETRY: 	    ECG:  	   	  CARDIAC MARKERS:                                      15.2   14.3  )-----------( 261      ( 08 Jul 2019 11:12 )             43.1     07-08    140  |  101  |  18  ----------------------------<  143<H>  4.6   |  27  |  0.88    Ca    8.9      08 Jul 2019 11:12      proBNP:   Lipid Profile:   HgA1c:   TSH:

## 2019-07-09 NOTE — CONSULT NOTE ADULT - ATTENDING COMMENTS
Advanced care planning was discussed with patient and family.  Risks, benefits and alternatives of the cardiac treatments and medical therapy including procedures were discussed in detail and all questions were answered. Importance of compliance with medical therapy and lifestyle modification to improve cardiovascular health were addressed. Appropriate forms and patient educational materials were reviewed. 30 minutes face to face spent.

## 2019-07-09 NOTE — DISCHARGE NOTE PROVIDER - HOSPITAL COURSE
65 year old male with history of obstructive sleep apnea presented to [facility] on 7/8 for a scheduled R total hip replacement with Dr. Wallace. Patient underwent procedure without complication. PT/OT evaluated patient and recommended discharge _______. Rest of hospital stay unremarkable. 65 year old male with history of obstructive sleep apnea presented to [facility] on 7/8 for a scheduled R total hip replacement with Dr. Wallace. Patient underwent procedure without complication. PT/OT evaluated patient and recommended discharge home with home physical therapy. Rest of hospital stay unremarkable.        I-Stop: This report was requested by: Dandre Abarca | Reference #: [id number]

## 2019-07-09 NOTE — PROGRESS NOTE ADULT - SUBJECTIVE AND OBJECTIVE BOX
Patient seen and examined. Pain controlled. No acute events overnight. denies cp/sob/fever/chills. per nurse, states he had some difficulty voiding yesterday. patient states after he had his gall bladder out he had the same issue but he has been urinating normally overnight and denies issues currently. no other complaints. states he has been ambulating by self w walker to bathroom without issue.     HEALTH ISSUES - PROBLEM Dx:  Prophylactic measure  YAAKOV (obstructive sleep apnea): Criteria  Unilateral primary osteoarthritis, right hip        MEDICATIONS  (STANDING):  acetaminophen   Tablet .. 975 milliGRAM(s) Oral every 6 hours  aspirin enteric coated 325 milliGRAM(s) Oral two times a day  celecoxib 200 milliGRAM(s) Oral two times a day  chlorhexidine 2% Cloths 1 Application(s) Topical daily  docusate sodium 100 milliGRAM(s) Oral three times a day  ketorolac   Injectable 15 milliGRAM(s) IV Push every 8 hours  lactated ringers. 1000 milliLiter(s) IV Continuous <Continuous>  lidocaine 1% Injectable 0.2 milliLiter(s) Local Injection once  pantoprazole    Tablet 40 milliGRAM(s) Oral before breakfast  polyethylene glycol 3350 17 Gram(s) Oral daily  sodium chloride 0.9% lock flush 3 milliLiter(s) IV Push every 8 hours    Allergies    No Known Allergies    Intolerances                            15.2   14.3  )-----------( 261      ( 08 Jul 2019 11:12 )             43.1     08 Jul 2019 11:12    140    |  101    |  18     ----------------------------<  143    4.6     |  27     |  0.88     Ca    8.9        08 Jul 2019 11:12        Vital Signs Last 24 Hrs  T(C): 36.7 (07-09-19 @ 04:43), Max: 37.2 (07-08-19 @ 15:05)  T(F): 98 (07-09-19 @ 04:43), Max: 98.9 (07-08-19 @ 15:05)  HR: 79 (07-09-19 @ 04:43) (79 - 103)  BP: 124/75 (07-09-19 @ 04:43) (107/70 - 140/75)  BP(mean): 91 (07-08-19 @ 13:00) (91 - 105)  RR: 18 (07-09-19 @ 04:43) (12 - 18)  SpO2: 99% (07-09-19 @ 04:43) (88% - 100%)    Physical Exam  Gen: NAD  RLE:   Dressing c/d/i  +ehl/fhl/ta/gs function  L2-S1 silt  Dp/pt pulse intact  No calf ttp  Compartments soft    A/P:  54y Male sp R ABA POD 1  Pain control  DVT ppx  PT/WBAT/Posterior hip precautions/abduction pillow/OOB  FU labs  Medical management appreciated  Incentive spirometry  Dispo planning

## 2019-07-09 NOTE — PROVIDER CONTACT NOTE (OTHER) - RECOMMENDATIONS
As per Jose, PA pt is already voiding. Allow pt to have more time. Safety maintained, will continue to monitor.
Notify provider

## 2019-07-09 NOTE — DISCHARGE NOTE PROVIDER - NSDCCPCAREPLAN_GEN_ALL_CORE_FT
PRINCIPAL DISCHARGE DIAGNOSIS  Diagnosis: Unilateral primary osteoarthritis, right hip  Assessment and Plan of Treatment:

## 2019-07-09 NOTE — PROGRESS NOTE ADULT - ATTENDING COMMENTS
Patient is a 54y old  Male right  ABA       Agree with last Resident or Physician's Assistant note:    Patient comfortable  No complaints    PHYSICAL EXAM:  NAD, Alert    [ ] Hip: Dressing C/D/I; sensation grossly intact to light touch; (+) DF/PF; (+) Distal Pulses; No Calf tenderness B/L, PAS         Physical therapy. Patient is ambulating, progressing with ROM, sitting comfortably.       Plan for Disposition:  home      Kenny Wallace MD  [facility]  [phone]

## 2019-07-09 NOTE — OCCUPATIONAL THERAPY INITIAL EVALUATION ADULT - DIAGNOSIS, OT EVAL
Pt demonstrated decreased strength, balance, ROM impacting pt's ability to participate in functional mobility and ADLs.

## 2019-07-09 NOTE — DISCHARGE NOTE PROVIDER - NSDCACTIVITY_GEN_ALL_CORE
Do not drive or operate machinery/Stairs allowed/No heavy lifting/straining/Walking - Indoors allowed/Walking - Outdoors allowed/Do not make important decisions

## 2019-07-09 NOTE — DISCHARGE NOTE NURSING/CASE MANAGEMENT/SOCIAL WORK - NSDCDPATPORTLINK_GEN_ALL_CORE
You can access the [facility] Patient Portal, offered by [facility], by registering with the following website: [email or web address]

## 2019-07-09 NOTE — DISCHARGE NOTE PROVIDER - CARE PROVIDER_API CALL
Kenny Wallace)  Orthopaedic Surgery  [address], [address]  [place] [address]  Phone: [phone]  Fax: [phone]  Follow Up Time:     Curtis Philip)  Cardiovascular Disease; Internal Medicine  [address], [address]  [place] [address]  Phone: [phone]  Fax: [phone]  Follow Up Time:

## 2019-07-09 NOTE — OCCUPATIONAL THERAPY INITIAL EVALUATION ADULT - PERTINENT HX OF CURRENT PROBLEM, REHAB EVAL
55 y/o male with c/o  RT groin pain x 5 yrs, gradually getting worse with severity over the past 2 yrs, no improvement with conservative management followed by ortho . Pt Denies injury, or radicular symptoms. s/p Right Total Hip Replacement on 7/8/19 . Xray Hip: Intraoperative view of the right hip labeled 8:35 AM, with limited osseous detail.

## 2019-07-09 NOTE — DISCHARGE NOTE PROVIDER - CARE PROVIDERS DIRECT ADDRESSES
,[email or web address]

## 2019-07-09 NOTE — DISCHARGE NOTE PROVIDER - NSDCFUADDINST_GEN_ALL_CORE_FT
Schedule followup appointment with Dr. Wallace' office two weeks after surgery for suture/staple removal if applicable. Keep dressing clean and dry. Continue taking aspirin 325 mg twice daily for 6 weeks postoperatively for DVT prophylaxis. PT/OT: weight bearing as tolerated RLE. Recommend scheduling followup appointment with PMD within 1 month of surgery for routine care.

## 2019-07-09 NOTE — PROVIDER CONTACT NOTE (OTHER) - ACTION/TREATMENT ORDERED:
As per Jose, PA pt is already voiding, allow pt to have more time. Safety maintained, will continue to monitor

## 2019-07-09 NOTE — PROVIDER CONTACT NOTE (OTHER) - ASSESSMENT
Pt states I feel pressure I have to go to the bathroom now. Post void pt states I felt like it was a lot more. Post void scan with 440cc after voiding 250cc. pt states I don't want a catheter, please just give me more time
pt with urgency to get to bathroom. Pt voided 250cc states it felt like I had a lot more in there. Pt bladder scanned with 440 cc. pt states I don't want a catheter just please give me more time

## 2019-07-11 PROBLEM — M16.11 UNILATERAL PRIMARY OSTEOARTHRITIS, RIGHT HIP: Chronic | Status: ACTIVE | Noted: 2019-06-24

## 2019-07-11 PROBLEM — G47.33 OBSTRUCTIVE SLEEP APNEA (ADULT) (PEDIATRIC): Chronic | Status: ACTIVE | Noted: 2019-06-24

## 2019-07-11 PROBLEM — E66.9 OBESITY, UNSPECIFIED: Chronic | Status: ACTIVE | Noted: 2019-06-24

## 2019-07-12 ENCOUNTER — INBOUND DOCUMENT (OUTPATIENT)
Age: 54
End: 2019-07-12

## 2019-07-17 LAB — SURGICAL PATHOLOGY STUDY: SIGNIFICANT CHANGE UP

## 2019-07-23 ENCOUNTER — APPOINTMENT (OUTPATIENT)
Dept: ORTHOPEDIC SURGERY | Facility: CLINIC | Age: 54
End: 2019-07-23
Payer: COMMERCIAL

## 2019-07-23 ENCOUNTER — TRANSCRIPTION ENCOUNTER (OUTPATIENT)
Age: 54
End: 2019-07-23

## 2019-07-23 DIAGNOSIS — Z96.641 PRESENCE OF RIGHT ARTIFICIAL HIP JOINT: ICD-10-CM

## 2019-07-23 PROCEDURE — 99024 POSTOP FOLLOW-UP VISIT: CPT

## 2019-07-23 PROCEDURE — 72170 X-RAY EXAM OF PELVIS: CPT

## 2019-07-23 NOTE — HISTORY OF PRESENT ILLNESS
[Chills] : no chills [Vomiting] : no vomiting [Fever] : no fever [Nausea] : no nausea [Excellent Pain Control] : has excellent pain control [Doing Well] : is doing well [de-identified] : s/p right hip replacement on 7/8/19. [de-identified] : Mr. CAROLINE DU is a 54 year male who returns to office status post right THR 7/8/19.\par Level of pain on scale of 1-10 is 1 out of 10 today.\par He is using a walker to help with ambulation, but it is more because patient feels safer with it than without it.\par Home care PT is helping a lot.\par Would like outpatient PT going forward.\par Tylenol PRN for pain.\par Aspirin for DVT prophylaxis he is also taking.\par Denies fever, chills, nausea, vomiting, constipation, diarrhea, incision site infection/drainage. [de-identified] : An AP of the pelvis with a lateral of his right hip shows a right Synthes dual mobility total hip replacement in good position and well fixed. [de-identified] : This patient will go to physical therapy 2-3 times a week and return to our office in 6 weeks for follow-up. [de-identified] : His wound is well-healed his bandages removed.  He is walking full weightbearing.  He can flex past 110 degrees of full extension.

## 2019-10-02 PROBLEM — Z60.2 PERSON LIVING ALONE: Status: ACTIVE | Noted: 2019-06-04

## 2024-01-10 ENCOUNTER — APPOINTMENT (OUTPATIENT)
Dept: GASTROENTEROLOGY | Facility: CLINIC | Age: 59
End: 2024-01-10
Payer: COMMERCIAL

## 2024-01-10 VITALS
DIASTOLIC BLOOD PRESSURE: 80 MMHG | TEMPERATURE: 97 F | BODY MASS INDEX: 32.78 KG/M2 | HEIGHT: 70 IN | OXYGEN SATURATION: 99 % | RESPIRATION RATE: 14 BRPM | SYSTOLIC BLOOD PRESSURE: 140 MMHG | HEART RATE: 87 BPM | WEIGHT: 229 LBS

## 2024-01-10 DIAGNOSIS — R49.0 DYSPHONIA: ICD-10-CM

## 2024-01-10 PROCEDURE — 99203 OFFICE O/P NEW LOW 30 MIN: CPT

## 2024-01-10 RX ORDER — PANTOPRAZOLE 40 MG/1
40 TABLET, DELAYED RELEASE ORAL DAILY
Qty: 30 | Refills: 1 | Status: ACTIVE | COMMUNITY
Start: 2024-01-10 | End: 1900-01-01

## 2024-01-10 NOTE — HISTORY OF PRESENT ILLNESS
[FreeTextEntry1] : 59 yo male, hx of overweight, distant hx of GERD, last seen 2010 with endoscopy with minimal esophagitis (less than LA grade A). For one month, has occasional hoarse voice at end of day, sensation of food stuck in pharynx and sub manidubular discomfort. DENIES gerd. No cough, fever, or weight loss. He is a nonsmoker, no significant ETOH. [de-identified] : 2010

## 2024-01-10 NOTE — ASSESSMENT
[FreeTextEntry1] : 59 yo male, hx of overweight, distant hx of GERD, last seen 2010 with endoscopy with minimal esophagitis (less than LA grade A). For one month, has occasional hoarse voice at end of day, sensation of food stuck in pharynx and sub manidubular discomfort. DENIES gerd. No cough, fever, or weight loss. He is a nonsmoker, no significant ETOH.  IMP: likely silent gerd exclude post nasal drip  PLAN: 1. trial of pantoprazole x 4 weeks 2. ENT referral to Dr. CLAUDY Alonso 3. RTO 6 -8 weeks

## 2024-02-14 ENCOUNTER — NON-APPOINTMENT (OUTPATIENT)
Age: 59
End: 2024-02-14

## 2024-02-14 ENCOUNTER — APPOINTMENT (OUTPATIENT)
Dept: OTOLARYNGOLOGY | Facility: CLINIC | Age: 59
End: 2024-02-14
Payer: COMMERCIAL

## 2024-02-14 VITALS
TEMPERATURE: 98 F | HEART RATE: 88 BPM | SYSTOLIC BLOOD PRESSURE: 121 MMHG | WEIGHT: 220 LBS | DIASTOLIC BLOOD PRESSURE: 76 MMHG | BODY MASS INDEX: 31.5 KG/M2 | HEIGHT: 70 IN

## 2024-02-14 DIAGNOSIS — R13.10 DYSPHAGIA, UNSPECIFIED: ICD-10-CM

## 2024-02-14 PROCEDURE — 99203 OFFICE O/P NEW LOW 30 MIN: CPT | Mod: 25

## 2024-02-14 PROCEDURE — 31575 DIAGNOSTIC LARYNGOSCOPY: CPT

## 2024-02-14 NOTE — ASSESSMENT
[FreeTextEntry1] : LPRD: - reflux precaution handout given and reviewed with patient - agree with pantoprazole 40mg daily x 1 month then taper off as reflux management strategies take effect - advised that symptoms may take a month or so to improve - discussed to return for eval if any dysphagia with solids, unintentional weight loss, odynophagia, voice changes, hemoptysis

## 2024-02-14 NOTE — HISTORY OF PRESENT ILLNESS
[de-identified] : 59yo male with throat discomfort, feels like something is there. No difficulty swallowing food but notes he chokes on his own saliva from time to time. He feels need to clear throat often and excess mucus. No smoking hx. Notes he saw Dr. Baumann and was started on pantoprazole but didnt start it yet as he wanted to get ENT check first.

## 2024-02-14 NOTE — PROCEDURE
[de-identified] : Reason for laryngoscopy: Oral exam unable to account for symptoms.  Flexible scope #2 used. Passed through nasal passage and nasopharynx/oropharynx/hypopharynx clear. Supraglottis normal. Glottis with fully mobile vocal cords without lesions or masses. Visualized subglottis clear. Postcricoid area without erythema or edema. No pooling of secretions.

## 2024-11-27 NOTE — PHYSICAL THERAPY INITIAL EVALUATION ADULT - PERTINENT HX OF CURRENT PROBLEM, REHAB EVAL
Left message for patient to call back with more information.    53 y/o male with c/o R groin pain x 5 yrs, gradually getting worse with severity over the past 2 yrs, no improvement with conservative management followed by ortho. Pt now presents s/p R THR.

## 2025-08-12 ENCOUNTER — NON-APPOINTMENT (OUTPATIENT)
Age: 60
End: 2025-08-12

## 2025-08-13 ENCOUNTER — APPOINTMENT (OUTPATIENT)
Dept: ORTHOPEDIC SURGERY | Facility: CLINIC | Age: 60
End: 2025-08-13
Payer: COMMERCIAL

## 2025-08-13 VITALS
HEART RATE: 76 BPM | BODY MASS INDEX: 31.5 KG/M2 | HEIGHT: 70 IN | SYSTOLIC BLOOD PRESSURE: 133 MMHG | DIASTOLIC BLOOD PRESSURE: 78 MMHG | WEIGHT: 220 LBS

## 2025-08-13 DIAGNOSIS — M54.16 RADICULOPATHY, LUMBAR REGION: ICD-10-CM

## 2025-08-13 DIAGNOSIS — S76.011A STRAIN OF MUSCLE, FASCIA AND TENDON OF RIGHT HIP, INITIAL ENCOUNTER: ICD-10-CM

## 2025-08-13 DIAGNOSIS — Z96.641 PRESENCE OF RIGHT ARTIFICIAL HIP JOINT: ICD-10-CM

## 2025-08-13 DIAGNOSIS — M51.379 OTH INTVRT DISC DEGEN, LUMBOSACR W/O LUM BCK OR LW EXTRM PN: ICD-10-CM

## 2025-08-13 PROCEDURE — 72100 X-RAY EXAM L-S SPINE 2/3 VWS: CPT

## 2025-08-13 PROCEDURE — 99204 OFFICE O/P NEW MOD 45 MIN: CPT

## 2025-08-13 PROCEDURE — 73522 X-RAY EXAM HIPS BI 3-4 VIEWS: CPT
